# Patient Record
Sex: MALE | Race: BLACK OR AFRICAN AMERICAN | NOT HISPANIC OR LATINO | Employment: UNEMPLOYED | URBAN - METROPOLITAN AREA
[De-identification: names, ages, dates, MRNs, and addresses within clinical notes are randomized per-mention and may not be internally consistent; named-entity substitution may affect disease eponyms.]

---

## 2019-09-20 DIAGNOSIS — M25.512 PAIN IN JOINT OF LEFT SHOULDER: Primary | ICD-10-CM

## 2019-10-16 ENCOUNTER — CLINICAL SUPPORT (OUTPATIENT)
Dept: REHABILITATION | Facility: OTHER | Age: 44
End: 2019-10-16
Payer: MEDICAID

## 2019-10-16 DIAGNOSIS — M25.612 DECREASED RANGE OF MOTION OF LEFT SHOULDER: ICD-10-CM

## 2019-10-16 DIAGNOSIS — M25.512 ACUTE PAIN OF LEFT SHOULDER: ICD-10-CM

## 2019-10-16 DIAGNOSIS — R29.3 POOR POSTURE: ICD-10-CM

## 2019-10-16 PROCEDURE — 97161 PT EVAL LOW COMPLEX 20 MIN: CPT | Mod: PN

## 2019-10-16 NOTE — PATIENT INSTRUCTIONS
FOAM ROLLER PEC MINOR STRETCH        Lie down on a foam roll and hold one of your wrists as it rests on your stomach.     Next, squeeze your shoulder blades together as you lower your shoulders toward the floor as shown.    Hold for a gentle stretch across your chest.    Copyright © 1732-3645 [employer]    SCAPULAR RETRACTIONS          Draw your shoulder blades back and down.    Hold for 5 seconds. Perform 20 reps, 2 times a day.    Copyright © 5524-0647 [employer]
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
## 2019-11-05 ENCOUNTER — CLINICAL SUPPORT (OUTPATIENT)
Dept: REHABILITATION | Facility: OTHER | Age: 44
End: 2019-11-05
Payer: MEDICAID

## 2019-11-05 DIAGNOSIS — M25.512 ACUTE PAIN OF LEFT SHOULDER: ICD-10-CM

## 2019-11-05 DIAGNOSIS — M25.612 DECREASED RANGE OF MOTION OF LEFT SHOULDER: ICD-10-CM

## 2019-11-05 DIAGNOSIS — R29.3 POOR POSTURE: ICD-10-CM

## 2019-11-05 PROCEDURE — 97110 THERAPEUTIC EXERCISES: CPT | Mod: PN

## 2019-11-05 NOTE — PROGRESS NOTES
"  Physical Therapy Daily Treatment Note     Name: Shantanu Payne  Clinic Number: [id number]    Therapy Diagnosis:   Encounter Diagnoses   Name Primary?    Acute pain of left shoulder     Decreased range of motion of left shoulder     Poor posture      Physician: Jonh Siddiqui    Visit Date: 2019    Physician Orders: PT Eval and Treat  Medical Diagnosis: M25.512 (ICD-10-CM) - Pain in joint of left shoulder   Evaluation Date: 10/16/2019  Authorization Period Expiration: 2019  Plan of Care Certification Period: 2019  Visit #/Visits authorized:      Time In: 1:00 PM  Time Out: 2:00 PM  Total Billable Time: 30 minutes    Precautions: Standard     Subjective     Pt reports: recently got an injection the shoulder and is experiencing less tingling. States most of the pain is on the AC joint and posterior shoulder. States he just got a referral for his LB.   He was compliant with home exercise program.  Response to previous treatment: no new changes  Functional change: no new changes    Pain: 7/10  Location: left shoulder      Objective     Shantanu received therapeutic exercises to develop strength, endurance, ROM, flexibility, posture and core stabilization for 30 minutes including:  Scap retractions 20 x 5"  1/2 foam pec stretch x 3 mins  1/2 foam serratus punch 2 x 10 x 3"  Supine no moneys w/ OTB 2 x 10 x 3"  Supine pull aparts w/ OTB 2 x 10 x 3"  Pulleys scaption x 3 mins  Rows w/ OTB 2 x 10  Pulldown w/ OTB 2 x 10    Shantanu received the following manual therapy techniques: Joint mobilizations, Myofacial release and Soft tissue Mobilization were applied to the: L shoulder for 5 minutes, includin min x preparation and application of Kineseotape to L shoulder. Y strip to deltoid, I strip over AC joint for decompression      Home Exercises Provided and Patient Education Provided     Education provided:   - importance of ROM and scapular stabilization for decreased pain, postural awareness, " "proper form and technique, rationale behind each ex     Written Home Exercises Provided: Patient instructed to cont prior HEP.  Exercises were reviewed and Shantanu was able to demonstrate them prior to the end of the session.  Shantanu demonstrated fair  understanding of the education provided.     See EMR under Media for exercises provided prior visit.    Assessment     Fair tolerance to initial treatment session. Session limited today due to patient being on the phone x 15 mins. Pt with mild reports of pain and "popping" at end-range flexion. Pt with reports of decreased pain in scaption plane. Added KT for a trial today to AC joint for decompression and decreased pain.   Shantanu is progressing well towards his goals.   Pt prognosis is Fair.     Pt will continue to benefit from skilled outpatient physical therapy to address the deficits listed in the problem list box on initial evaluation, provide pt/family education and to maximize pt's level of independence in the home and community environment.     Pt's spiritual, cultural and educational needs considered and pt agreeable to plan of care and goals.     Anticipated barriers to physical therapy: none    Goals:   Short Term Goals: 4 weeks   1. Independent with HEP. (progressing, not met)  2. Report decreased L UE pain < or =  5/10 with adls such as reaching forward, reaching behind him, and turning in bed.  (progressing, not met)   3. Increased MMT for B UE by 1 muscle grade to promote proper scapular stability to decrease L UE pain < or =  5/10 with adls such as reaching forward, reaching behind him, and turning in bed.  (progressing, not met)   4. Increased AROM L shoulder flexion to 150 deg, L shoulder abd 150 deg, L shoulder IR 60 deg, L shoulder ER 80 deg. (progressing, not met)          Long Term Goals: 8 weeks   5. Report decreased L UE pain < or =  2/10 with adls such as reaching forward, reaching behind him, and turning in bed.  (progressing, not met)  6. Increased " MMT for B UE to 5/5 muscle grade to promote proper scapular stability to decrease L UE pain < or =  2/10 with adls such as reaching forward, reaching behind him, and turning in bed. (progressing, not met)     7. Increased AROM L shoulder flexion to 170 deg, L shoulder abd 170 deg. (progressing, not met)   8. Patient to achieve CJ (at least 20% < 40% impaired, limited or restricted) level on the FOTO Outcomes Measurement System. (progressing, not met)     Plan     Continue with established PT Plan of Care and focus on ROM, scapular stabilization, and manual therapy.     Edgar Sharma, PTA

## 2019-11-06 DIAGNOSIS — M54.9 DORSALGIA: Primary | ICD-10-CM

## 2019-11-07 ENCOUNTER — CLINICAL SUPPORT (OUTPATIENT)
Dept: REHABILITATION | Facility: OTHER | Age: 44
End: 2019-11-07
Payer: MEDICAID

## 2019-11-07 DIAGNOSIS — M25.612 DECREASED RANGE OF MOTION OF LEFT SHOULDER: ICD-10-CM

## 2019-11-07 DIAGNOSIS — R29.3 POOR POSTURE: ICD-10-CM

## 2019-11-07 DIAGNOSIS — M25.512 ACUTE PAIN OF LEFT SHOULDER: ICD-10-CM

## 2019-11-07 PROCEDURE — 97110 THERAPEUTIC EXERCISES: CPT | Mod: PN

## 2019-11-12 ENCOUNTER — CLINICAL SUPPORT (OUTPATIENT)
Dept: REHABILITATION | Facility: OTHER | Age: 44
End: 2019-11-12
Payer: MEDICAID

## 2019-11-12 DIAGNOSIS — R29.3 POOR POSTURE: ICD-10-CM

## 2019-11-12 DIAGNOSIS — R29.898 WEAKNESS OF BOTH HIPS: ICD-10-CM

## 2019-11-12 DIAGNOSIS — M54.50 ACUTE BILATERAL LOW BACK PAIN WITHOUT SCIATICA: ICD-10-CM

## 2019-11-12 DIAGNOSIS — M25.512 ACUTE PAIN OF LEFT SHOULDER: ICD-10-CM

## 2019-11-12 DIAGNOSIS — M25.612 DECREASED RANGE OF MOTION OF LEFT SHOULDER: ICD-10-CM

## 2019-11-12 PROCEDURE — 97110 THERAPEUTIC EXERCISES: CPT | Mod: PN

## 2019-11-12 NOTE — PATIENT INSTRUCTIONS
Supine Active Hamstring Stretch        Grasp behind knee and keep leg at arms length. Extend leg up until a gentle stretch is found behind your knee.     The opposite knee can be bent or straight at your therapists discretion. Perform 3 reps holding for 30 seconds.     Perform twice a day.    Copyright © 0427-6470 [employer]      Flexors, Standing        Stand, one foot on chair. Use support for balance, if needed. Slowly shift weight toward raised knee until stretch is felt in quadriceps of standing leg and hamstrings of forward leg.     Hold _30__ seconds.  Repeat _3_ times per session. Do _1-2_ sessions per day.    Copyright © I. All rights reserved.

## 2019-11-13 PROBLEM — R29.898 WEAKNESS OF BOTH HIPS: Status: ACTIVE | Noted: 2019-11-13
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
## 2020-01-08 ENCOUNTER — CLINICAL SUPPORT (OUTPATIENT)
Dept: REHABILITATION | Facility: OTHER | Age: 45
End: 2020-01-08
Payer: MEDICAID

## 2020-01-08 DIAGNOSIS — M25.612 DECREASED RANGE OF MOTION OF LEFT SHOULDER: ICD-10-CM

## 2020-01-08 DIAGNOSIS — M54.50 ACUTE BILATERAL LOW BACK PAIN WITHOUT SCIATICA: ICD-10-CM

## 2020-01-08 DIAGNOSIS — R29.898 WEAKNESS OF BOTH HIPS: ICD-10-CM

## 2020-01-08 DIAGNOSIS — R29.3 POOR POSTURE: ICD-10-CM

## 2020-01-08 DIAGNOSIS — M25.512 ACUTE PAIN OF LEFT SHOULDER: ICD-10-CM

## 2020-01-08 PROCEDURE — 97110 THERAPEUTIC EXERCISES: CPT | Mod: PN

## 2020-01-08 NOTE — PLAN OF CARE
Outpatient Therapy Updated Plan of Care     Visit Date: 1/8/2020  Name: Shantanu Payne  Clinic Number: [id number]    Therapy Diagnosis:   Encounter Diagnoses   Name Primary?    Weakness of both hips     Acute bilateral low back pain without sciatica     Acute pain of left shoulder     Decreased range of motion of left shoulder     Poor posture      Physician: Jonh Siddiqui*    Physician Orders: PT Eval and Treat  Medical Diagnosis:   M25.512 (ICD-10-CM) - Pain in joint of left shoulder   M54.9 (ICD-10-CM) - Dorsalgia  M25.519 (ICD-10-CM) - Pain in unspecified shoulde  Evaluation Date: 10/16/2019    Total Visits Received: 5  Cancelled Visits: 4  No Show Visits: 0    Current Certification Period:  10/16/19 to 12/11/19  Precautions:  Standard  Visits from Evaluation Date:  4  Functional Level Prior to Evaluation:  I with amd and ADL, but pain and difficulty with ADL    Time In: 12:00 PM  Time Out: 12:55 PM  Total Billable Time: 55 minutes    Subjective     Update:   Pt states he has been doing yard work, but as he does it he continues to feel increased pain in his L shoulder. States his back has been feeling better  He was compliant with home exercise program.  Response to previous treatment: no adverse effects  Functional change: still having pain with repetitive motions in his L shoulder     Pain: 4/10  Location: left shoulder and low back       Objective     Update:        Cervical Special Tests     Compression negative   Distraction negative   Alar Ligament Stress Test: negative   Sharp-Perry Test negative   Spurling's:    negative      UE MMT R L   C3 Cervical side bend 5/5 5/5   C4 Shoulder Shrug 5/5  5/5   Shoulder flexion 5/5 3-/5   Shoulder abduction 5/5 3-/5   Shoulder IR 5/5 5/5   Shoulder ER 5/5 5/5    C5 Elbow flexion 5/5 5/5   C7 Elbow extension 5/5 5/5   C6 Wrist extension 5/5 5/5   Wrist flexion 5/5 5/5   Scapular protraction 5/5 5/5   Mid Traps 5/5 4+/5   Lower traps 4-/5 3+/5      * -  with pain       Joint Mobility:                   R                     L  Shoulder flex                Full          135 deg/170 deg  Shoulder abd               Full           110 deg/160 deg  Shoulder IR                   Full                50 deg  Shoulder ER               90 deg             70 deg     Flexibility testing:  - hamstrings:         B: tight, R: -45 deg, L: -45 deg       - gastrocnemius:   R: tight, L: WNL. R: 3 deg, L: 10 deg        - piriformis:            R: WNL, L: tight, decreased 25%        - quadriceps:         R: WNL, L: tight, decreased 50%          - hip flexors:          B: tight, decreased 25%  - hip adductors:    B: tight, decreased 25%  - IT Bands:           B: WNL     MMT R L   Hip flexion 5/5 5/5   Hip abduction 5/5 5/5   Hip extension 5/5 5/5   Hip ER 5/5 5/5   Hip IR 5/5 5/5   Knee extension 5/5 5/5   Knee flexion 5/5 5/5   Ankle dorsiflexion 5/5 5/5   Ankle plantar flexion 5/5 5/5   Ankle inversion 5/5 5/5   Ankle eversion 5/5 5/5       CMS Impairment/Limitation/Restriction for FOTO Shoulder Survey    Therapist reviewed FOTO scores for Shantanu Payne on 1/8/2020.   FOTO documents entered into [employer] - see Media section.    Limitation Score: 41%  Category: Carrying    Current : CK = at least 40% but < 60% impaired, limited or restricted  Goal: CJ = at least 20% but < 40% impaired, limited or restricted     Shantanu received therapeutic exercises to develop strength, endurance, ROM, flexibility, posture and core stabilization for 40 minutes including:  Reassessed strength, flexibility, ROM, and G code  UBE 3' forward, 3' reverse    Shantanu received the following manual therapy techniques: dry needling were applied to the: L scapula/upper back for 15 minutes, including:  Application of TDN: Pt educated on benefits and potential side effects of dry needling. Educated pt on benefits, precautions, side effects following TDN. Educated pt to use heat following treatment sessions if pt is  experiencing pain or soreness. Pt verbalized good understanding of education.  Pt signed written consent to dry needling. Pt gave verbal consent for DN    Pt received dry needling to the below listed muscles using 40 mm needles.  L upper trap  L levator scapula muscle  L levator scapula teno-osseous junction    Winding performed every 5 minutes during treatment.      Assessment     Update: Patient has experienced improvement in low back pain, but continues to experienced L shoulder pain. Has increase L shoulder flexion ROM, but still has decreased L shoulder abduction. Initiated dry needling to L shoulder to decrease pain and improve function.    Previous Goals Status:     Short Term Goals: 4 weeks   1. Independent with HEP. (met with current HEP)  2. Report decreased L UE pain < or =  5/10 with adls such as reaching forward, reaching behind him, and turning in bed.  (not met)   3. Increased MMT for B UE by 1 muscle grade to promote proper scapular stability to decrease L UE pain < or =  5/10 with adls such as reaching forward, reaching behind him, and turning in bed.  (not met)   4. Increased AROM L shoulder flexion to 150 deg, L shoulder abd 150 deg, L shoulder IR 60 deg, L shoulder ER 80 deg. (not met)          Long Term Goals: 8 weeks   5. Report decreased L UE pain < or =  2/10 with adls such as reaching forward, reaching behind him, and turning in bed.  (not met)  6. Increased MMT for B UE to 5/5 muscle grade to promote proper scapular stability to decrease L UE pain < or =  2/10 with adls such as reaching forward, reaching behind him, and turning in bed. (not met)     7. Increased AROM L shoulder flexion to 170 deg, L shoulder abd 170 deg. (progressing, not met)   8. Report decreased lumbar pain < or =  3/10 with adls such as prolonged standing, walking, and bending. (not met)  9. Increased MMT for B LE to 5/5 muscle grade to promote proper pelvic stability to decrease lumbar pain < or =  3/10 with adls such  as prolonged standing, walking, and bending. (met)  10. Increased flexibility in B hamstrings to -20 deg with 90/90 test to promote proper pelvic stability to decrease lumbar pain < or =  3/10 with adls such as prolonged standing, walking, and bending. (not met)  11. Increased flexibility in B hip adductors, B piriformis, B hip flexors, and B quads to promote proper pelvic stability to decrease lumbar pain < or =  3/10 with adls such as prolonged standing, walking, and bending. (progressing, not met)  12. Patient to achieve CJ (at least 20% < 40% impaired, limited or restricted) level on the FOTO Outcomes Measurement System. (progressing, not met)        Long Term Goal Status:   continue per initial plan of care.  Reasons for Recertification of Therapy:   Patient canceled last three appointments secondary wanting to follow up with MDs about his shoulder. Will benefit from continued OP PT to increase L shoulder AROM, strength, function, and to decrease L shoulder pain.    Plan     Updated Certification Period: 1/8/2020 to 3/4/2020  Recommended Treatment Plan: 2 times per week for 8 weeks: Manual Therapy, Moist Heat/ Ice, Neuromuscular Re-ed, Patient Education, Therapeutic Activites, Therapeutic Exercise and dry needling      Ariel Baldwin, PT  1/8/2020      I CERTIFY THE NEED FOR THESE SERVICES FURNISHED UNDER THIS PLAN OF TREATMENT AND WHILE UNDER MY CARE    Physician's comments:        Physician's Signature: ___________________________________________________

## 2020-01-21 ENCOUNTER — CLINICAL SUPPORT (OUTPATIENT)
Dept: REHABILITATION | Facility: OTHER | Age: 45
End: 2020-01-21
Payer: MEDICAID

## 2020-01-21 DIAGNOSIS — R29.898 WEAKNESS OF BOTH HIPS: ICD-10-CM

## 2020-01-21 DIAGNOSIS — R29.3 POOR POSTURE: ICD-10-CM

## 2020-01-21 DIAGNOSIS — M25.612 DECREASED RANGE OF MOTION OF LEFT SHOULDER: ICD-10-CM

## 2020-01-21 DIAGNOSIS — M25.512 ACUTE PAIN OF LEFT SHOULDER: ICD-10-CM

## 2020-01-21 DIAGNOSIS — M54.50 ACUTE BILATERAL LOW BACK PAIN WITHOUT SCIATICA: ICD-10-CM

## 2020-01-21 PROCEDURE — 97110 THERAPEUTIC EXERCISES: CPT | Mod: PN,CQ

## 2020-01-21 NOTE — PROGRESS NOTES
"  Physical Therapy Daily Treatment Note     Name: Shantanu Payne  Clinic Number: [id number]    Therapy Diagnosis:   Encounter Diagnoses   Name Primary?    Weakness of both hips     Acute bilateral low back pain without sciatica     Acute pain of left shoulder     Decreased range of motion of left shoulder     Poor posture      Physician: Jonh Siddiqui*    Visit Date: 1/21/2020    Physician Orders: PT Eval and Treat  Medical Diagnosis:   M25.512 (ICD-10-CM) - Pain in joint of left shoulder   M54.9 (ICD-10-CM) - Dorsalgia  M25.519 (ICD-10-CM) - Pain in unspecified shoulde  Evaluation Date: 10/16/2019   Total Visits Received: 6  Cancelled Visits: 4  No Show Visits: 0  Current Certification Period:  10/16/19 to 12/11/19  Precautions:  Standard  Visits from Evaluation Date:  5  Functional Level Prior to Evaluation:  I with amd and ADL, but pain and difficulty with ADL    Time In: 1:00 PM  Time Out: 2:05 PM  Total Billable Time: 65 minutes    Precautions: Standard    Subjective     Pt reports: feeling moderate pain in his L shoulder and low back. States that sitting usually feels better.   He was compliant with home exercise program.  Response to previous treatment: tolerated fair  Functional change:none  Response to previous treatment: no adverse effects  Functional change: still having pain with repetitive motions in his L shoulder     Pain: 7/10 in low back; 4/10 in L shoulder  Location: left shoulder and low back      Objective     Shantanu received therapeutic exercises to develop strength, endurance, ROM, flexibility, posture and core stabilization for 35 minutes including:    UBE 3' forward, 3' reverse  LTR 2x10 3"  Bridging with ball squeeze 20x 5"  Piriformis stretch 3x30" (B)  Supine hip flexor stretch 2x20" (B)  SKTC 3x20"   DKTC with PB 20x 3"  Rows w/OTB 2x10  Shld ext w/OTB 2x10  No monies 2x10 w/OTB  TrA 10x5"    Shantanu received the following manual therapy techniques: dry needling were applied to the: " L scapula/upper back for 00 minutes, including:  Application of TDN: Pt educated on benefits and potential side effects of dry needling. Educated pt on benefits, precautions, side effects following TDN. Educated pt to use heat following treatment sessions if pt is experiencing pain or soreness. Pt verbalized good understanding of education.  Pt signed written consent to dry needling. Pt gave verbal consent for DN NP     Pt received dry needling to the below listed muscles using 40 mm needles.  L upper trap  L levator scapula muscle  L levator scapula teno-osseous junction     Winding performed every 5 minutes during treatment. NP      Shantanu received hot pack for 00 minutes to NP.    Shantanu received cold pack for 00 minutes to NP.    **Taken at 1/8/2020**    UE MMT R L   C3 Cervical side bend 5/5 5/5   C4 Shoulder Shrug 5/5  5/5   Shoulder flexion 5/5 3-/5   Shoulder abduction 5/5 3-/5   Shoulder IR 5/5 5/5   Shoulder ER 5/5 5/5    C5 Elbow flexion 5/5 5/5   C7 Elbow extension 5/5 5/5   C6 Wrist extension 5/5 5/5   Wrist flexion 5/5 5/5   Scapular protraction 5/5 5/5   Mid Traps 5/5 4+/5   Lower traps 4-/5 3+/5      * - with pain        Joint Mobility:                   R                     L  Shoulder flex                Full          135 deg/170 deg  Shoulder abd               Full           110 deg/160 deg  Shoulder IR                   Full                50 deg  Shoulder ER               90 deg             70 deg     Flexibility testing:  - hamstrings:         B: tight, R: -45 deg, L: -45 deg       - gastrocnemius:   R: tight, L: WNL. R: 3 deg, L: 10 deg        - piriformis:            R: WNL, L: tight, decreased 25%        - quadriceps:         R: WNL, L: tight, decreased 50%          - hip flexors:          B: tight, decreased 25%  - hip adductors:    B: tight, decreased 25%  - IT Bands:           B: WNL       Home Exercises Provided and Patient Education Provided     Education provided:   Rational for there-ex,  POC and HEP    Written Home Exercises Provided: yes.  Exercises were reviewed and Shantanu was able to demonstrate them prior to the end of the session.  Shantanu demonstrated good  understanding of the education provided.     See EMR under Patient Instructions for exercises provided 1/21/2020.    Assessment     Pt tolerated exercise fair with mod complaints of increased pain. Pt was demonstrated moderate muscle guarding during LTR in supine. Muscle weakness in paraspinals/core musculature were notable during exercises. Pt was able to demonstrate improved core/transverse abdominis activation after cuing.  Pt requires verbal/tactile cues to initiate exercise and to correct form. PT/PTA face to face conference concerning pt status/TX.     Shantanu is progressing well towards his goals.   Pt prognosis is Fair.       Pt will continue to benefit from skilled outpatient physical therapy to address the deficits listed in the problem list box on initial evaluation, provide pt/family education and to maximize pt's level of independence in the home and community environment.     Pt's spiritual, cultural and educational needs considered and pt agreeable to plan of care and goals.     Anticipated barriers to physical therapy: none     Update: Patient has experienced improvement in low back pain, but continues to experienced L shoulder pain. Has increase L shoulder flexion ROM, but still has decreased L shoulder abduction. Initiated dry needling to L shoulder to decrease pain and improve function.     Previous Goals Status:     Short Term Goals: 4 weeks   1. Independent with HEP. (met with current HEP)  2. Report decreased L UE pain < or =  5/10 with adls such as reaching forward, reaching behind him, and turning in bed.  (not met)   3. Increased MMT for B UE by 1 muscle grade to promote proper scapular stability to decrease L UE pain < or =  5/10 with adls such as reaching forward, reaching behind him, and turning in bed.  (not met)    4. Increased AROM L shoulder flexion to 150 deg, L shoulder abd 150 deg, L shoulder IR 60 deg, L shoulder ER 80 deg. (not met)          Long Term Goals: 8 weeks   5. Report decreased L UE pain < or =  2/10 with adls such as reaching forward, reaching behind him, and turning in bed.  (not met)  6. Increased MMT for B UE to 5/5 muscle grade to promote proper scapular stability to decrease L UE pain < or =  2/10 with adls such as reaching forward, reaching behind him, and turning in bed. (not met)     7. Increased AROM L shoulder flexion to 170 deg, L shoulder abd 170 deg. (progressing, not met)   8. Report decreased lumbar pain < or =  3/10 with adls such as prolonged standing, walking, and bending. (not met)  9. Increased MMT for B LE to 5/5 muscle grade to promote proper pelvic stability to decrease lumbar pain < or =  3/10 with adls such as prolonged standing, walking, and bending. (met)  10. Increased flexibility in B hamstrings to -20 deg with 90/90 test to promote proper pelvic stability to decrease lumbar pain < or =  3/10 with adls such as prolonged standing, walking, and bending. (not met)  11. Increased flexibility in B hip adductors, B piriformis, B hip flexors, and B quads to promote proper pelvic stability to decrease lumbar pain < or =  3/10 with adls such as prolonged standing, walking, and bending. (progressing, not met)  12. Patient to achieve CJ (at least 20% < 40% impaired, limited or restricted) level on the FOTO Outcomes Measurement System. (progressing, not met)         Long Term Goal Status:   continue per initial plan of care.  Reasons for Recertification of Therapy:   Patient canceled last three appointments secondary wanting to follow up with MDs about his shoulder. Will benefit from continued OP PT to increase L shoulder AROM, strength, function, and to decrease L shoulder pain.      Plan   Updated Certification Period: 1/8/2020 to 3/4/2020  Recommended Treatment Plan: 2 times per week for  8 weeks: Manual Therapy, Moist Heat/ Ice, Neuromuscular Re-ed, Patient Education, Therapeutic Activites, Therapeutic Exercise and dry needling    Continue with current POC for further strengthening, flexibility, improve ROM/posture and ADL performance. Will progress there-ex as tolerated.       Fidencio Liu, PTA

## 2020-01-23 ENCOUNTER — CLINICAL SUPPORT (OUTPATIENT)
Dept: REHABILITATION | Facility: OTHER | Age: 45
End: 2020-01-23
Payer: MEDICAID

## 2020-01-23 DIAGNOSIS — M25.512 ACUTE PAIN OF LEFT SHOULDER: ICD-10-CM

## 2020-01-23 DIAGNOSIS — R29.3 POOR POSTURE: ICD-10-CM

## 2020-01-23 DIAGNOSIS — M54.50 ACUTE BILATERAL LOW BACK PAIN WITHOUT SCIATICA: ICD-10-CM

## 2020-01-23 DIAGNOSIS — M25.612 DECREASED RANGE OF MOTION OF LEFT SHOULDER: ICD-10-CM

## 2020-01-23 DIAGNOSIS — R29.898 WEAKNESS OF BOTH HIPS: ICD-10-CM

## 2020-01-23 PROCEDURE — 97110 THERAPEUTIC EXERCISES: CPT | Mod: PN,CQ

## 2020-01-23 NOTE — PROGRESS NOTES
"  Physical Therapy Daily Treatment Note     Name: Shantanu Payne  Clinic Number: [id number]    Therapy Diagnosis:   Encounter Diagnoses   Name Primary?    Weakness of both hips     Acute bilateral low back pain without sciatica     Acute pain of left shoulder     Decreased range of motion of left shoulder     Poor posture      Physician: Jonh Siddiqui*    Visit Date: 1/23/2020    Physician Orders: PT Eval and Treat  Medical Diagnosis:   M25.512 (ICD-10-CM) - Pain in joint of left shoulder   M54.9 (ICD-10-CM) - Dorsalgia  M25.519 (ICD-10-CM) - Pain in unspecified shoulder  Evaluation Date: 10/16/2019   Total Visits Received: 7  Cancelled Visits: 4  No Show Visits: 0  Current Certification Period:  10/16/19 to 12/11/19  Precautions:  Standard  Visits from Evaluation Date:  6  Functional Level Prior to Evaluation:  I with amd and ADL, but pain and difficulty with ADL    Time In: 12:00 PM  Time Out: 1:00 PM  Total Billable Time: 60 minutes    Precautions: Standard    Subjective     Pt reports: feeling much better in his Low back. States he is not getting any pain today. Reports that his shoulder is about a 3/10  He was compliant with home exercise program.  Response to previous treatment: tolerated well  Functional change: Decreased pain reported in the Lumbar region  Response to previous treatment: no adverse effects  Functional change: still having pain with repetitive motions in his L shoulder     Pain: 0/10 in low back; 3/10 in L shoulder  Location: left shoulder and low back      Objective     Shantanu received therapeutic exercises to develop strength, endurance, ROM, flexibility, posture and core stabilization for 38 minutes including:    UBE 3' forward, 3' reverse  LTR 2x10 3"  Bridging with ball squeeze 20x 5"  Piriformis stretch 3x30" (B)  Supine hip flexor stretch 2x20" (B)  SKTC 3x20"   DKTC with PB 20x 3"  Rows w/OTB 2x10  Shld ext w/OTB 2x10  No monies 2x10 w/OTB  TrA 10x5"    Shantanu received the " following manual therapy techniques: dry needling were applied to the: L scapula/upper back for 00 minutes, including:  Application of TDN: Pt educated on benefits and potential side effects of dry needling. Educated pt on benefits, precautions, side effects following TDN. Educated pt to use heat following treatment sessions if pt is experiencing pain or soreness. Pt verbalized good understanding of education.  Pt signed written consent to dry needling. Pt gave verbal consent for DN NP     Pt received dry needling to the below listed muscles using 40 mm needles.  L upper trap  L levator scapula muscle  L levator scapula teno-osseous junction     Winding performed every 5 minutes during treatment. NP      Shantanu received hot pack for 00 minutes to NP.    Shantanu received cold pack for 00 minutes to NP.    **Taken at 1/8/2020**    UE MMT R L   C3 Cervical side bend 5/5 5/5   C4 Shoulder Shrug 5/5  5/5   Shoulder flexion 5/5 3-/5   Shoulder abduction 5/5 3-/5   Shoulder IR 5/5 5/5   Shoulder ER 5/5 5/5    C5 Elbow flexion 5/5 5/5   C7 Elbow extension 5/5 5/5   C6 Wrist extension 5/5 5/5   Wrist flexion 5/5 5/5   Scapular protraction 5/5 5/5   Mid Traps 5/5 4+/5   Lower traps 4-/5 3+/5      * - with pain        Joint Mobility:                   R                     L  Shoulder flex                Full          135 deg/170 deg  Shoulder abd               Full           110 deg/160 deg  Shoulder IR                   Full                50 deg  Shoulder ER               90 deg             70 deg     Flexibility testing:  - hamstrings:         B: tight, R: -45 deg, L: -45 deg       - gastrocnemius:   R: tight, L: WNL. R: 3 deg, L: 10 deg        - piriformis:            R: WNL, L: tight, decreased 25%        - quadriceps:         R: WNL, L: tight, decreased 50%          - hip flexors:          B: tight, decreased 25%  - hip adductors:    B: tight, decreased 25%  - IT Bands:           B: WNL       Home Exercises Provided and  Patient Education Provided     Education provided:   Rational for there-ex, POC and HEP    Written Home Exercises Provided: yes.  Exercises were reviewed and Shantanu was able to demonstrate them prior to the end of the session.  Shantanu demonstrated good  understanding of the education provided.     See EMR under Patient Instructions for exercises provided 1/21/2020.    Assessment     Pt tolerated exercise with improved performance. Little to no much guarding during Lumbar exercise was noted today. Pt reported slightly increased soreness with scapular/shoulder retraction during rows today. Pt reported pain in the L shoulder was localized to the supraspinatus muscle belly.     Shantanu is progressing well towards his goals.   Pt prognosis is Fair.       Pt will continue to benefit from skilled outpatient physical therapy to address the deficits listed in the problem list box on initial evaluation, provide pt/family education and to maximize pt's level of independence in the home and community environment.     Pt's spiritual, cultural and educational needs considered and pt agreeable to plan of care and goals.     Anticipated barriers to physical therapy: none     Update: Patient has experienced improvement in low back pain, but continues to experienced L shoulder pain. Has increase L shoulder flexion ROM, but still has decreased L shoulder abduction. Initiated dry needling to L shoulder to decrease pain and improve function.     Previous Goals Status:     Short Term Goals: 4 weeks   1. Independent with HEP. (met with current HEP)  2. Report decreased L UE pain < or =  5/10 with adls such as reaching forward, reaching behind him, and turning in bed.  (not met)   3. Increased MMT for B UE by 1 muscle grade to promote proper scapular stability to decrease L UE pain < or =  5/10 with adls such as reaching forward, reaching behind him, and turning in bed.  (not met)   4. Increased AROM L shoulder flexion to 150 deg, L shoulder abd  150 deg, L shoulder IR 60 deg, L shoulder ER 80 deg. (not met)          Long Term Goals: 8 weeks   5. Report decreased L UE pain < or =  2/10 with adls such as reaching forward, reaching behind him, and turning in bed.  (not met)  6. Increased MMT for B UE to 5/5 muscle grade to promote proper scapular stability to decrease L UE pain < or =  2/10 with adls such as reaching forward, reaching behind him, and turning in bed. (not met)     7. Increased AROM L shoulder flexion to 170 deg, L shoulder abd 170 deg. (progressing, not met)   8. Report decreased lumbar pain < or =  3/10 with adls such as prolonged standing, walking, and bending. (not met)  9. Increased MMT for B LE to 5/5 muscle grade to promote proper pelvic stability to decrease lumbar pain < or =  3/10 with adls such as prolonged standing, walking, and bending. (met)  10. Increased flexibility in B hamstrings to -20 deg with 90/90 test to promote proper pelvic stability to decrease lumbar pain < or =  3/10 with adls such as prolonged standing, walking, and bending. (not met)  11. Increased flexibility in B hip adductors, B piriformis, B hip flexors, and B quads to promote proper pelvic stability to decrease lumbar pain < or =  3/10 with adls such as prolonged standing, walking, and bending. (progressing, not met)  12. Patient to achieve CJ (at least 20% < 40% impaired, limited or restricted) level on the FOTO Outcomes Measurement System. (progressing, not met)         Long Term Goal Status:   continue per initial plan of care.  Reasons for Recertification of Therapy:   Patient canceled last three appointments secondary wanting to follow up with MDs about his shoulder. Will benefit from continued OP PT to increase L shoulder AROM, strength, function, and to decrease L shoulder pain.      Plan   Updated Certification Period: 1/8/2020 to 3/4/2020  Recommended Treatment Plan: 2 times per week for 8 weeks: Manual Therapy, Moist Heat/ Ice, Neuromuscular Re-ed,  Patient Education, Therapeutic Activites, Therapeutic Exercise and dry needling    Continue with current POC for further strengthening, flexibility, improve ROM/posture and ADL performance. Will progress there-ex as tolerated.       Fidencio Liu, PTA

## 2020-01-27 ENCOUNTER — CLINICAL SUPPORT (OUTPATIENT)
Dept: REHABILITATION | Facility: OTHER | Age: 45
End: 2020-01-27
Payer: MEDICAID

## 2020-01-27 DIAGNOSIS — R29.3 POOR POSTURE: ICD-10-CM

## 2020-01-27 DIAGNOSIS — R29.898 WEAKNESS OF BOTH HIPS: ICD-10-CM

## 2020-01-27 DIAGNOSIS — M54.50 ACUTE BILATERAL LOW BACK PAIN WITHOUT SCIATICA: ICD-10-CM

## 2020-01-27 DIAGNOSIS — M25.512 ACUTE PAIN OF LEFT SHOULDER: ICD-10-CM

## 2020-01-27 DIAGNOSIS — M25.612 DECREASED RANGE OF MOTION OF LEFT SHOULDER: ICD-10-CM

## 2020-01-27 PROCEDURE — 97110 THERAPEUTIC EXERCISES: CPT | Mod: PN,CQ

## 2020-01-27 NOTE — PROGRESS NOTES
"  Physical Therapy Daily Treatment Note     Name: Shantanu Payne  Clinic Number: [id number]    Therapy Diagnosis:   Encounter Diagnoses   Name Primary?    Weakness of both hips     Acute bilateral low back pain without sciatica     Acute pain of left shoulder     Decreased range of motion of left shoulder     Poor posture      Physician: Jonh Siddiqui*    Visit Date: 1/27/2020    Physician Orders: PT Eval and Treat  Medical Diagnosis:   M25.512 (ICD-10-CM) - Pain in joint of left shoulder   M54.9 (ICD-10-CM) - Dorsalgia  M25.519 (ICD-10-CM) - Pain in unspecified shoulder  Evaluation Date: 10/16/2019   Total Visits Received: 8  Cancelled Visits: 4  No Show Visits: 0  Current Certification Period:  10/16/19 to 12/11/19  Precautions:  Standard  Visits from Evaluation Date:  7  Functional Level Prior to Evaluation:  I with amd and ADL, but pain and difficulty with ADL    Time In: 12:15 PM (pt arrived late)  Time Out: 1:00 PM  Total Billable Time:45 minutes    Precautions: Standard    Subjective     Pt reports: feeling much better in his Low back. States he is not getting any pain today. Reports that his shoulder is about a 3/10  He was compliant with home exercise program.  Response to previous treatment: tolerated well  Functional change: Decreased pain reported in the Lumbar region  Response to previous treatment: no adverse effects  Functional change: still having pain with repetitive motions in his L shoulder     Pain: 0/10 in low back; 3/10 in L shoulder  Location: left shoulder and low back      Objective     Shantanu received therapeutic exercises to develop strength, endurance, ROM, flexibility, posture and core stabilization for 45  minutes including:    UBE 3' forward, 3' reverse  LTR 2x10 3"  Bridging with ball squeeze 20x 5"  Piriformis stretch 3x30" (B)  Supine hip flexor stretch 2x20" (B)  SKTC 3x20"   DKTC with PB 20x 3"  Rows w/OTB 2x10  Shld ext w/OTB 2x10  IR/ER w/OTB 20x ea  No monies 2x10 " "w/OTB  TrA 10x5"    Shantanu received the following manual therapy techniques: dry needling were applied to the: L scapula/upper back for 00 minutes, including:  Application of TDN: Pt educated on benefits and potential side effects of dry needling. Educated pt on benefits, precautions, side effects following TDN. Educated pt to use heat following treatment sessions if pt is experiencing pain or soreness. Pt verbalized good understanding of education.  Pt signed written consent to dry needling. Pt gave verbal consent for DN NP     Pt received dry needling to the below listed muscles using 40 mm needles.  L upper trap  L levator scapula muscle  L levator scapula teno-osseous junction     Winding performed every 5 minutes during treatment. NP      Shantanu received hot pack for 00 minutes to NP.    Shantanu received cold pack for 00 minutes to NP.    **Taken at 1/8/2020**    UE MMT R L   C3 Cervical side bend 5/5 5/5   C4 Shoulder Shrug 5/5  5/5   Shoulder flexion 5/5 3-/5   Shoulder abduction 5/5 3-/5   Shoulder IR 5/5 5/5   Shoulder ER 5/5 5/5    C5 Elbow flexion 5/5 5/5   C7 Elbow extension 5/5 5/5   C6 Wrist extension 5/5 5/5   Wrist flexion 5/5 5/5   Scapular protraction 5/5 5/5   Mid Traps 5/5 4+/5   Lower traps 4-/5 3+/5      * - with pain        Joint Mobility:                   R                     L  Shoulder flex                Full          135 deg/170 deg  Shoulder abd               Full           110 deg/160 deg  Shoulder IR                   Full                50 deg  Shoulder ER               90 deg             70 deg     Flexibility testing:  - hamstrings:         B: tight, R: -45 deg, L: -45 deg       - gastrocnemius:   R: tight, L: WNL. R: 3 deg, L: 10 deg        - piriformis:            R: WNL, L: tight, decreased 25%        - quadriceps:         R: WNL, L: tight, decreased 50%          - hip flexors:          B: tight, decreased 25%  - hip adductors:    B: tight, decreased 25%  - IT Bands:           B: " WNL       Home Exercises Provided and Patient Education Provided     Education provided:   Rational for there-ex, POC and HEP    Written Home Exercises Provided: yes.  Exercises were reviewed and Shantanu was able to demonstrate them prior to the end of the session.  Shantanu demonstrated good  understanding of the education provided.     See EMR under Patient Instructions for exercises provided 1/21/2020.    Assessment     Pt tolerated exercise well. Muscle weakness was notable with shoulder external rotation exercises. Decreased muscle guarding was noted during trunk rotation this visit. Pt reported increased ADL performance during subjective/verbal.    Shantanu is progressing well towards his goals.   Pt prognosis is Fair.       Pt will continue to benefit from skilled outpatient physical therapy to address the deficits listed in the problem list box on initial evaluation, provide pt/family education and to maximize pt's level of independence in the home and community environment.     Pt's spiritual, cultural and educational needs considered and pt agreeable to plan of care and goals.     Anticipated barriers to physical therapy: none     Update: Patient has experienced improvement in low back pain, but continues to experienced L shoulder pain. Has increase L shoulder flexion ROM, but still has decreased L shoulder abduction. Initiated dry needling to L shoulder to decrease pain and improve function.     Previous Goals Status:     Short Term Goals: 4 weeks   1. Independent with HEP. (met with current HEP)  2. Report decreased L UE pain < or =  5/10 with adls such as reaching forward, reaching behind him, and turning in bed.  (not met)   3. Increased MMT for B UE by 1 muscle grade to promote proper scapular stability to decrease L UE pain < or =  5/10 with adls such as reaching forward, reaching behind him, and turning in bed.  (not met)   4. Increased AROM L shoulder flexion to 150 deg, L shoulder abd 150 deg, L shoulder IR  60 deg, L shoulder ER 80 deg. (not met)          Long Term Goals: 8 weeks   5. Report decreased L UE pain < or =  2/10 with adls such as reaching forward, reaching behind him, and turning in bed.  (not met)  6. Increased MMT for B UE to 5/5 muscle grade to promote proper scapular stability to decrease L UE pain < or =  2/10 with adls such as reaching forward, reaching behind him, and turning in bed. (not met)     7. Increased AROM L shoulder flexion to 170 deg, L shoulder abd 170 deg. (progressing, not met)   8. Report decreased lumbar pain < or =  3/10 with adls such as prolonged standing, walking, and bending. (not met)  9. Increased MMT for B LE to 5/5 muscle grade to promote proper pelvic stability to decrease lumbar pain < or =  3/10 with adls such as prolonged standing, walking, and bending. (met)  10. Increased flexibility in B hamstrings to -20 deg with 90/90 test to promote proper pelvic stability to decrease lumbar pain < or =  3/10 with adls such as prolonged standing, walking, and bending. (not met)  11. Increased flexibility in B hip adductors, B piriformis, B hip flexors, and B quads to promote proper pelvic stability to decrease lumbar pain < or =  3/10 with adls such as prolonged standing, walking, and bending. (progressing, not met)  12. Patient to achieve CJ (at least 20% < 40% impaired, limited or restricted) level on the FOTO Outcomes Measurement System. (progressing, not met)         Long Term Goal Status:   continue per initial plan of care.  Reasons for Recertification of Therapy:   Patient canceled last three appointments secondary wanting to follow up with MDs about his shoulder. Will benefit from continued OP PT to increase L shoulder AROM, strength, function, and to decrease L shoulder pain.      Plan   Updated Certification Period: 1/8/2020 to 3/4/2020  Recommended Treatment Plan: 2 times per week for 8 weeks: Manual Therapy, Moist Heat/ Ice, Neuromuscular Re-ed, Patient Education,  Therapeutic Activites, Therapeutic Exercise and dry needling    Continue with current POC for further strengthening, flexibility, improve ROM/posture and ADL performance. Will progress there-ex as tolerated.       Fidencio Liu, PTA

## 2020-02-05 ENCOUNTER — CLINICAL SUPPORT (OUTPATIENT)
Dept: REHABILITATION | Facility: OTHER | Age: 45
End: 2020-02-05
Payer: MEDICAID

## 2020-02-05 DIAGNOSIS — M25.512 ACUTE PAIN OF LEFT SHOULDER: ICD-10-CM

## 2020-02-05 DIAGNOSIS — M25.612 DECREASED RANGE OF MOTION OF LEFT SHOULDER: ICD-10-CM

## 2020-02-05 DIAGNOSIS — R29.898 WEAKNESS OF BOTH HIPS: ICD-10-CM

## 2020-02-05 DIAGNOSIS — R29.3 POOR POSTURE: ICD-10-CM

## 2020-02-05 DIAGNOSIS — M54.50 ACUTE BILATERAL LOW BACK PAIN WITHOUT SCIATICA: ICD-10-CM

## 2020-02-05 PROCEDURE — 97110 THERAPEUTIC EXERCISES: CPT | Mod: PN

## 2020-02-05 NOTE — PROGRESS NOTES
"                            Physical Therapy Daily Treatment Note     Name: Shantanu Payne  Clinic Number: [id number]    Therapy Diagnosis:   Encounter Diagnoses   Name Primary?    Weakness of both hips     Acute bilateral low back pain without sciatica     Acute pain of left shoulder     Decreased range of motion of left shoulder     Poor posture      Physician: Jonh Siddiqui*    Visit Date: 2/5/2020  Physician Orders: PT Eval and Treat  Medical Diagnosis:   M25.512 (ICD-10-CM) - Pain in joint of left shoulder   M54.9 (ICD-10-CM) - Dorsalgia  M25.519 (ICD-10-CM) - Pain in unspecified shoulder  Evaluation Date: 10/16/2019  Authorization Period Expiration: 4/8/2020  Plan of Care Certification Period: 3/4/2020  Visit #/Visits authorized: 9 (5/20)    Time In: 12:00 PM  Time Out: 12:55 PM  Total Billable Time: 30 minutes    Precautions: Standard    Subjective   Pt reports: his back is feeling fine. States the left shoulder is better, but a day or two ago had some increased pain.  He was compliant with home exercise program.  Response to previous treatment: decreased pain  Functional change: no new changes reported    Pain: 3/10  Location: shoulder  left    Objective     Shantanu received therapeutic exercises to develop strength, ROM, flexibility, posture and core stabilization for 30 minutes including:  UBE 3' forward, 3' reverse  Rows w/GTB 20 x 3"  Shld ext w/GTB 20 x 3"  IR/ER w/GTB 20x ea  +prone Ws 20 x 3"  +Prone Ts 20 x 3"  +Prone Ys 20 x 3"  +Prone Is 20 x 3"        LTR 2x10 3"  Bridging with ball squeeze 20x 5"  Piriformis stretch 3x30" (B)  Supine hip flexor stretch 2x20" (B)  SKTC 3x20"   DKTC with PB 20x 3"    No monies 2x10 w/GTB  TrA 10x5"      Shantanu received the following manual therapy techniques: dry needling were applied to the: L scapula/upper back for 20 minutes, including:  Application of TDN: Pt educated on benefits and potential side effects of dry needling. Educated pt on benefits, " precautions, side effects following TDN. Educated pt to use heat following treatment sessions if pt is experiencing pain or soreness. Pt verbalized good understanding of education.  Pt signed written consent to dry needling. Pt gave verbal consent for DN NP     Pt received dry needling to the below listed muscles using 40 mm needles.  L upper trap  L levator scapula muscle  L levator scapula teno-osseous junction  L rhomboids  L infraspinatus  L supraspinatus     Winding performed every 5 minutes during treatment. NP    Shantanu received hot pack for 10 minutes to L shoulder.    Home Exercises Provided and Patient Education Provided     Education provided:   prone Ws   Prone Ts   Prone Ys   Prone Is     Written Home Exercises Provided: yes.  Exercises were reviewed and Shantanu was able to demonstrate them prior to the end of the session.  Shantanu demonstrated good  understanding of the education provided.     See EMR under Patient Instructions for exercises provided 2/3/2020.    Assessment     Increased resistance with bands today. Dry needles to Infraspinatus, Supraspinatus, and Rhomboids today. Good soft tissue response to dry needling evident by increased grasp with unilateral winding at all insertion points. Winding performed every 5 minutes during treatment. No adverse effects following treatment.    Shantanu is progressing well towards his goals.   Pt prognosis is Good.     Pt will continue to benefit from skilled outpatient physical therapy to address the deficits listed in the problem list box on initial evaluation, provide pt/family education and to maximize pt's level of independence in the home and community environment.     Pt's spiritual, cultural and educational needs considered and pt agreeable to plan of care and goals.     Anticipated barriers to physical therapy: none     Goals:  Short Term Goals: 4 weeks   1. Independent with HEP. (met with current HEP)  2. Report decreased L UE pain < or =  5/10 with adls such  as reaching forward, reaching behind him, and turning in bed.  (not met)   3. Increased MMT for B UE by 1 muscle grade to promote proper scapular stability to decrease L UE pain < or =  5/10 with adls such as reaching forward, reaching behind him, and turning in bed.  (not met)   4. Increased AROM L shoulder flexion to 150 deg, L shoulder abd 150 deg, L shoulder IR 60 deg, L shoulder ER 80 deg. (not met)          Long Term Goals: 8 weeks   5. Report decreased L UE pain < or =  2/10 with adls such as reaching forward, reaching behind him, and turning in bed.  (not met)  6. Increased MMT for B UE to 5/5 muscle grade to promote proper scapular stability to decrease L UE pain < or =  2/10 with adls such as reaching forward, reaching behind him, and turning in bed. (not met)     7. Increased AROM L shoulder flexion to 170 deg, L shoulder abd 170 deg. (progressing, not met)   8. Report decreased lumbar pain < or =  3/10 with adls such as prolonged standing, walking, and bending. (not met)  9. Increased MMT for B LE to 5/5 muscle grade to promote proper pelvic stability to decrease lumbar pain < or =  3/10 with adls such as prolonged standing, walking, and bending. (met)  10. Increased flexibility in B hamstrings to -20 deg with 90/90 test to promote proper pelvic stability to decrease lumbar pain < or =  3/10 with adls such as prolonged standing, walking, and bending. (not met)  11. Increased flexibility in B hip adductors, B piriformis, B hip flexors, and B quads to promote proper pelvic stability to decrease lumbar pain < or =  3/10 with adls such as prolonged standing, walking, and bending. (progressing, not met)  12. Patient to achieve CJ (at least 20% < 40% impaired, limited or restricted) level on the FOTO Outcomes Measurement System. (progressing, not met)            Plan     Continue with scapular stabilization and dry needling.    Ariel Baldwin, PT

## 2020-02-05 NOTE — PATIENT INSTRUCTIONS
PRONE W        Lying face down with your elbows bent and palms facing downward, slowly raise your arms up towards the ceiling as you squeeze your shoulder blades downward and towards your spine.     Hold for  3  Seconds. Perform 1 sets of  20  Reps.    Copyright © 5579-1484 [employer]      PRONE Y        Lying face down with your arms stretched out upwards as shown, slowly move your arms upward towards the ceiling as you squeeze your shoulder blades downward and towards your spine.     Hold for  3  Seconds. Perform 1 sets of  20  Reps.    Copyright © 5377-1153 [employer]    PRONE RETRACTION EXTENSION - PRONE I          Lying face down with your arms by your side, slowly move your arms upward towards the ceiling as you squeeze your shoulder blades downwards and towards your spine.     Hold for  3  Seconds. Perform 1 sets of  20  Reps.    Copyright © 0567-2939 [employer]    Prone Ts        Start Position: arms out to your sides (like an airplane wing) Palms facing down.    End position: squeeze your shoulder blades together and raise your arms up     Hold for  3  Seconds. Perform 1 sets of  20  Reps.    Copyright © 1590-9987 [employer]

## 2021-01-25 ENCOUNTER — TELEPHONE (OUTPATIENT)
Dept: HEMATOLOGY/ONCOLOGY | Facility: CLINIC | Age: 46
End: 2021-01-25

## 2021-02-10 ENCOUNTER — TELEPHONE (OUTPATIENT)
Dept: HEMATOLOGY/ONCOLOGY | Facility: CLINIC | Age: 46
End: 2021-02-10

## 2021-03-19 ENCOUNTER — TELEPHONE (OUTPATIENT)
Dept: HEMATOLOGY/ONCOLOGY | Facility: CLINIC | Age: 46
End: 2021-03-19

## 2021-03-22 ENCOUNTER — TELEPHONE (OUTPATIENT)
Dept: HEMATOLOGY/ONCOLOGY | Facility: CLINIC | Age: 46
End: 2021-03-22

## 2021-04-14 DIAGNOSIS — R91.1 SOLITARY LUNG NODULE: Primary | ICD-10-CM

## 2021-04-16 ENCOUNTER — LAB VISIT (OUTPATIENT)
Dept: INTERNAL MEDICINE | Facility: CLINIC | Age: 46
End: 2021-04-16
Payer: MEDICAID

## 2021-04-16 ENCOUNTER — LAB VISIT (OUTPATIENT)
Dept: LAB | Facility: HOSPITAL | Age: 46
End: 2021-04-16
Attending: INTERNAL MEDICINE
Payer: MEDICAID

## 2021-04-16 ENCOUNTER — OFFICE VISIT (OUTPATIENT)
Dept: HEMATOLOGY/ONCOLOGY | Facility: CLINIC | Age: 46
End: 2021-04-16
Payer: MEDICAID

## 2021-04-16 VITALS
HEIGHT: 73 IN | SYSTOLIC BLOOD PRESSURE: 121 MMHG | DIASTOLIC BLOOD PRESSURE: 77 MMHG | OXYGEN SATURATION: 100 % | TEMPERATURE: 98 F | RESPIRATION RATE: 16 BRPM | HEART RATE: 77 BPM | WEIGHT: 161.25 LBS | BODY MASS INDEX: 21.37 KG/M2

## 2021-04-16 DIAGNOSIS — D72.819 LEUKOPENIA, UNSPECIFIED TYPE: Primary | ICD-10-CM

## 2021-04-16 DIAGNOSIS — Z13.9 SCREENING PROCEDURE: ICD-10-CM

## 2021-04-16 DIAGNOSIS — D72.819 LEUKOPENIA, UNSPECIFIED TYPE: ICD-10-CM

## 2021-04-16 PROBLEM — R29.3 POOR POSTURE: Status: RESOLVED | Noted: 2019-10-16 | Resolved: 2021-04-16

## 2021-04-16 LAB
ALBUMIN SERPL BCP-MCNC: 4.2 G/DL (ref 3.5–5.2)
ALP SERPL-CCNC: 74 U/L (ref 55–135)
ALT SERPL W/O P-5'-P-CCNC: 11 U/L (ref 10–44)
ANION GAP SERPL CALC-SCNC: 11 MMOL/L (ref 8–16)
AST SERPL-CCNC: 19 U/L (ref 10–40)
BASOPHILS # BLD AUTO: 0.04 K/UL (ref 0–0.2)
BASOPHILS NFR BLD: 0.9 % (ref 0–1.9)
BILIRUB SERPL-MCNC: 0.9 MG/DL (ref 0.1–1)
BUN SERPL-MCNC: 11 MG/DL (ref 6–20)
CALCIUM SERPL-MCNC: 9.4 MG/DL (ref 8.7–10.5)
CHLORIDE SERPL-SCNC: 102 MMOL/L (ref 95–110)
CO2 SERPL-SCNC: 25 MMOL/L (ref 23–29)
CREAT SERPL-MCNC: 1.1 MG/DL (ref 0.5–1.4)
DIFFERENTIAL METHOD: ABNORMAL
EOSINOPHIL # BLD AUTO: 0.1 K/UL (ref 0–0.5)
EOSINOPHIL NFR BLD: 2.6 % (ref 0–8)
ERYTHROCYTE [DISTWIDTH] IN BLOOD BY AUTOMATED COUNT: 13 % (ref 11.5–14.5)
EST. GFR  (AFRICAN AMERICAN): >60 ML/MIN/1.73 M^2
EST. GFR  (NON AFRICAN AMERICAN): >60 ML/MIN/1.73 M^2
FOLATE SERPL-MCNC: 14.7 NG/ML (ref 4–24)
GLUCOSE SERPL-MCNC: 80 MG/DL (ref 70–110)
HBV CORE AB SERPL QL IA: NEGATIVE
HBV SURFACE AG SERPL QL IA: NEGATIVE
HCT VFR BLD AUTO: 47.8 % (ref 40–54)
HCV AB SERPL QL IA: NEGATIVE
HGB BLD-MCNC: 15.3 G/DL (ref 14–18)
HIV 1+2 AB+HIV1 P24 AG SERPL QL IA: NEGATIVE
IMM GRANULOCYTES # BLD AUTO: 0.01 K/UL (ref 0–0.04)
IMM GRANULOCYTES NFR BLD AUTO: 0.2 % (ref 0–0.5)
LYMPHOCYTES # BLD AUTO: 1.7 K/UL (ref 1–4.8)
LYMPHOCYTES NFR BLD: 36.2 % (ref 18–48)
MCH RBC QN AUTO: 28.4 PG (ref 27–31)
MCHC RBC AUTO-ENTMCNC: 32 G/DL (ref 32–36)
MCV RBC AUTO: 89 FL (ref 82–98)
MONOCYTES # BLD AUTO: 0.4 K/UL (ref 0.3–1)
MONOCYTES NFR BLD: 9.4 % (ref 4–15)
NEUTROPHILS # BLD AUTO: 2.3 K/UL (ref 1.8–7.7)
NEUTROPHILS NFR BLD: 50.7 % (ref 38–73)
NRBC BLD-RTO: 0 /100 WBC
PATH REV BLD -IMP: NORMAL
PLATELET # BLD AUTO: 278 K/UL (ref 150–450)
PMV BLD AUTO: 8.9 FL (ref 9.2–12.9)
POTASSIUM SERPL-SCNC: 4.6 MMOL/L (ref 3.5–5.1)
PROT SERPL-MCNC: 8.6 G/DL (ref 6–8.4)
RBC # BLD AUTO: 5.38 M/UL (ref 4.6–6.2)
SODIUM SERPL-SCNC: 138 MMOL/L (ref 136–145)
VIT B12 SERPL-MCNC: 534 PG/ML (ref 210–950)
WBC # BLD AUTO: 4.59 K/UL (ref 3.9–12.7)

## 2021-04-16 PROCEDURE — 99204 OFFICE O/P NEW MOD 45 MIN: CPT | Mod: S$PBB,,, | Performed by: INTERNAL MEDICINE

## 2021-04-16 PROCEDURE — U0003 INFECTIOUS AGENT DETECTION BY NUCLEIC ACID (DNA OR RNA); SEVERE ACUTE RESPIRATORY SYNDROME CORONAVIRUS 2 (SARS-COV-2) (CORONAVIRUS DISEASE [COVID-19]), AMPLIFIED PROBE TECHNIQUE, MAKING USE OF HIGH THROUGHPUT TECHNOLOGIES AS DESCRIBED BY CMS-2020-01-R: HCPCS | Performed by: INTERNAL MEDICINE

## 2021-04-16 PROCEDURE — 85025 COMPLETE CBC W/AUTO DIFF WBC: CPT | Performed by: INTERNAL MEDICINE

## 2021-04-16 PROCEDURE — U0005 INFEC AGEN DETEC AMPLI PROBE: HCPCS | Performed by: INTERNAL MEDICINE

## 2021-04-16 PROCEDURE — 85060 PATHOLOGIST REVIEW: ICD-10-PCS | Mod: ,,, | Performed by: PATHOLOGY

## 2021-04-16 PROCEDURE — 86803 HEPATITIS C AB TEST: CPT | Performed by: INTERNAL MEDICINE

## 2021-04-16 PROCEDURE — 82746 ASSAY OF FOLIC ACID SERUM: CPT | Performed by: INTERNAL MEDICINE

## 2021-04-16 PROCEDURE — 99999 PR PBB SHADOW E&M-EST. PATIENT-LVL III: ICD-10-PCS | Mod: PBBFAC,,, | Performed by: INTERNAL MEDICINE

## 2021-04-16 PROCEDURE — 99999 PR PBB SHADOW E&M-EST. PATIENT-LVL III: CPT | Mod: PBBFAC,,, | Performed by: INTERNAL MEDICINE

## 2021-04-16 PROCEDURE — 82607 VITAMIN B-12: CPT | Performed by: INTERNAL MEDICINE

## 2021-04-16 PROCEDURE — 82525 ASSAY OF COPPER: CPT | Performed by: INTERNAL MEDICINE

## 2021-04-16 PROCEDURE — 99213 OFFICE O/P EST LOW 20 MIN: CPT | Mod: PBBFAC | Performed by: INTERNAL MEDICINE

## 2021-04-16 PROCEDURE — 86703 HIV-1/HIV-2 1 RESULT ANTBDY: CPT | Performed by: INTERNAL MEDICINE

## 2021-04-16 PROCEDURE — 86704 HEP B CORE ANTIBODY TOTAL: CPT | Performed by: INTERNAL MEDICINE

## 2021-04-16 PROCEDURE — 99204 PR OFFICE/OUTPT VISIT, NEW, LEVL IV, 45-59 MIN: ICD-10-PCS | Mod: S$PBB,,, | Performed by: INTERNAL MEDICINE

## 2021-04-16 PROCEDURE — 87340 HEPATITIS B SURFACE AG IA: CPT | Performed by: INTERNAL MEDICINE

## 2021-04-16 PROCEDURE — 86663 EPSTEIN-BARR ANTIBODY: CPT | Performed by: INTERNAL MEDICINE

## 2021-04-16 PROCEDURE — 85060 BLOOD SMEAR INTERPRETATION: CPT | Mod: ,,, | Performed by: PATHOLOGY

## 2021-04-16 PROCEDURE — 80053 COMPREHEN METABOLIC PANEL: CPT | Performed by: INTERNAL MEDICINE

## 2021-04-16 RX ORDER — ERGOCALCIFEROL 1.25 MG/1
CAPSULE ORAL
COMMUNITY

## 2021-04-17 LAB — SARS-COV-2 RNA RESP QL NAA+PROBE: NOT DETECTED

## 2021-04-19 ENCOUNTER — HOSPITAL ENCOUNTER (OUTPATIENT)
Dept: PULMONOLOGY | Facility: CLINIC | Age: 46
Discharge: HOME | End: 2021-04-19
Payer: MEDICAID

## 2021-04-19 DIAGNOSIS — R91.1 SOLITARY LUNG NODULE: ICD-10-CM

## 2021-04-19 LAB
COPPER SERPL-MCNC: 1340 UG/L (ref 665–1480)
EBV EA IGG SER-ACNC: <5 U/ML
EBV NA IGG SER-ACNC: 392 U/ML
EBV VCA IGG SER-ACNC: >750 U/ML
EBV VCA IGM SER-ACNC: <10 U/ML
PATH REV BLD -IMP: NORMAL

## 2021-04-19 PROCEDURE — 94729 DIFFUSING CAPACITY: CPT | Mod: 26,S$PBB,, | Performed by: INTERNAL MEDICINE

## 2021-04-19 PROCEDURE — 94060 PR EVAL OF BRONCHOSPASM: ICD-10-PCS | Mod: 26,S$PBB,, | Performed by: INTERNAL MEDICINE

## 2021-04-19 PROCEDURE — 94060 EVALUATION OF WHEEZING: CPT | Mod: PBBFAC | Performed by: INTERNAL MEDICINE

## 2021-04-19 PROCEDURE — 94729 DIFFUSING CAPACITY: CPT | Mod: PBBFAC | Performed by: INTERNAL MEDICINE

## 2021-04-19 PROCEDURE — 94729 PR C02/MEMBANE DIFFUSE CAPACITY: ICD-10-PCS | Mod: 26,S$PBB,, | Performed by: INTERNAL MEDICINE

## 2021-04-19 PROCEDURE — 94727 GAS DIL/WSHOT DETER LNG VOL: CPT | Mod: 26,S$PBB,, | Performed by: INTERNAL MEDICINE

## 2021-04-19 PROCEDURE — 94727 GAS DIL/WSHOT DETER LNG VOL: CPT | Mod: PBBFAC | Performed by: INTERNAL MEDICINE

## 2021-04-19 PROCEDURE — 94727 PR PULM FUNCTION TEST BY GAS: ICD-10-PCS | Mod: 26,S$PBB,, | Performed by: INTERNAL MEDICINE

## 2021-04-19 PROCEDURE — 94060 EVALUATION OF WHEEZING: CPT | Mod: 26,S$PBB,, | Performed by: INTERNAL MEDICINE

## 2021-04-20 ENCOUNTER — TELEPHONE (OUTPATIENT)
Dept: HEMATOLOGY/ONCOLOGY | Facility: CLINIC | Age: 46
End: 2021-04-20

## 2021-04-21 ENCOUNTER — TELEPHONE (OUTPATIENT)
Dept: UROLOGY | Facility: CLINIC | Age: 46
End: 2021-04-21

## 2022-06-17 ENCOUNTER — HOSPITAL ENCOUNTER (EMERGENCY)
Facility: OTHER | Age: 47
Discharge: HOME | End: 2022-06-17
Attending: EMERGENCY MEDICINE
Payer: MEDICAID

## 2022-06-17 VITALS
HEIGHT: 72 IN | DIASTOLIC BLOOD PRESSURE: 80 MMHG | SYSTOLIC BLOOD PRESSURE: 132 MMHG | BODY MASS INDEX: 21.4 KG/M2 | OXYGEN SATURATION: 100 % | RESPIRATION RATE: 16 BRPM | WEIGHT: 158 LBS | HEART RATE: 73 BPM | TEMPERATURE: 99 F

## 2022-06-17 DIAGNOSIS — R07.9 CHEST PAIN: ICD-10-CM

## 2022-06-17 DIAGNOSIS — R06.02 SOB (SHORTNESS OF BREATH): Primary | ICD-10-CM

## 2022-06-17 LAB
ALBUMIN SERPL BCP-MCNC: 4 G/DL (ref 3.5–5.2)
ALP SERPL-CCNC: 55 U/L (ref 55–135)
ALT SERPL W/O P-5'-P-CCNC: 22 U/L (ref 10–44)
ANION GAP SERPL CALC-SCNC: 11 MMOL/L (ref 8–16)
AST SERPL-CCNC: 30 U/L (ref 10–40)
BASOPHILS # BLD AUTO: 0.03 K/UL (ref 0–0.2)
BASOPHILS NFR BLD: 0.5 % (ref 0–1.9)
BILIRUB SERPL-MCNC: 1 MG/DL (ref 0.1–1)
BNP SERPL-MCNC: <10 PG/ML (ref 0–99)
BUN SERPL-MCNC: 10 MG/DL (ref 6–20)
CALCIUM SERPL-MCNC: 8.9 MG/DL (ref 8.7–10.5)
CHLORIDE SERPL-SCNC: 105 MMOL/L (ref 95–110)
CO2 SERPL-SCNC: 23 MMOL/L (ref 23–29)
CREAT SERPL-MCNC: 1 MG/DL (ref 0.5–1.4)
CTP QC/QA: YES
DIFFERENTIAL METHOD: ABNORMAL
EOSINOPHIL # BLD AUTO: 0 K/UL (ref 0–0.5)
EOSINOPHIL NFR BLD: 0.3 % (ref 0–8)
ERYTHROCYTE [DISTWIDTH] IN BLOOD BY AUTOMATED COUNT: 13 % (ref 11.5–14.5)
EST. GFR  (AFRICAN AMERICAN): >60 ML/MIN/1.73 M^2
EST. GFR  (NON AFRICAN AMERICAN): >60 ML/MIN/1.73 M^2
GLUCOSE SERPL-MCNC: 82 MG/DL (ref 70–110)
HCT VFR BLD AUTO: 48.7 % (ref 40–54)
HGB BLD-MCNC: 15.7 G/DL (ref 14–18)
IMM GRANULOCYTES # BLD AUTO: 0.02 K/UL (ref 0–0.04)
IMM GRANULOCYTES NFR BLD AUTO: 0.3 % (ref 0–0.5)
LYMPHOCYTES # BLD AUTO: 0.8 K/UL (ref 1–4.8)
LYMPHOCYTES NFR BLD: 12.7 % (ref 18–48)
MCH RBC QN AUTO: 28.7 PG (ref 27–31)
MCHC RBC AUTO-ENTMCNC: 32.2 G/DL (ref 32–36)
MCV RBC AUTO: 89 FL (ref 82–98)
MONOCYTES # BLD AUTO: 0.3 K/UL (ref 0.3–1)
MONOCYTES NFR BLD: 4.5 % (ref 4–15)
NEUTROPHILS # BLD AUTO: 5.4 K/UL (ref 1.8–7.7)
NEUTROPHILS NFR BLD: 81.7 % (ref 38–73)
NRBC BLD-RTO: 0 /100 WBC
PLATELET # BLD AUTO: 297 K/UL (ref 150–450)
PMV BLD AUTO: 8.8 FL (ref 9.2–12.9)
POTASSIUM SERPL-SCNC: 4.1 MMOL/L (ref 3.5–5.1)
PROT SERPL-MCNC: 7.9 G/DL (ref 6–8.4)
RBC # BLD AUTO: 5.47 M/UL (ref 4.6–6.2)
SARS-COV-2 RDRP RESP QL NAA+PROBE: NEGATIVE
SODIUM SERPL-SCNC: 139 MMOL/L (ref 136–145)
TROPONIN I SERPL DL<=0.01 NG/ML-MCNC: <0.006 NG/ML (ref 0–0.03)
TROPONIN I SERPL DL<=0.01 NG/ML-MCNC: <0.006 NG/ML (ref 0–0.03)
WBC # BLD AUTO: 6.64 K/UL (ref 3.9–12.7)

## 2022-06-17 PROCEDURE — U0002 COVID-19 LAB TEST NON-CDC: HCPCS | Performed by: EMERGENCY MEDICINE

## 2022-06-17 PROCEDURE — 84484 ASSAY OF TROPONIN QUANT: CPT | Mod: 91 | Performed by: NURSE PRACTITIONER

## 2022-06-17 PROCEDURE — 25000003 PHARM REV CODE 250: Performed by: NURSE PRACTITIONER

## 2022-06-17 PROCEDURE — 93010 EKG 12-LEAD: ICD-10-PCS | Mod: ,,, | Performed by: INTERNAL MEDICINE

## 2022-06-17 PROCEDURE — 85025 COMPLETE CBC W/AUTO DIFF WBC: CPT | Performed by: NURSE PRACTITIONER

## 2022-06-17 PROCEDURE — 83880 ASSAY OF NATRIURETIC PEPTIDE: CPT | Performed by: NURSE PRACTITIONER

## 2022-06-17 PROCEDURE — 93005 ELECTROCARDIOGRAM TRACING: CPT

## 2022-06-17 PROCEDURE — 80053 COMPREHEN METABOLIC PANEL: CPT | Performed by: NURSE PRACTITIONER

## 2022-06-17 PROCEDURE — 99285 EMERGENCY DEPT VISIT HI MDM: CPT | Mod: 25

## 2022-06-17 PROCEDURE — 93010 ELECTROCARDIOGRAM REPORT: CPT | Mod: ,,, | Performed by: INTERNAL MEDICINE

## 2022-06-17 RX ORDER — ASPIRIN 325 MG
325 TABLET ORAL
Status: COMPLETED | OUTPATIENT
Start: 2022-06-17 | End: 2022-06-17

## 2022-06-17 RX ADMIN — ASPIRIN 325 MG ORAL TABLET 325 MG: 325 PILL ORAL at 07:06

## 2022-06-17 NOTE — FIRST PROVIDER EVALUATION
" Emergency Department TeleTriage Encounter Note      CHIEF COMPLAINT    Chief Complaint   Patient presents with    Shortness of Breath       Pt to ER with c/o sudden SOB while driving. Pt reports chest tightness. Pt denies hx of anxiety. No N/V, dizziness, blurred vision.        VITAL SIGNS   Initial Vitals [06/17/22 1631]   BP Pulse Resp Temp SpO2   124/87 71 18 98 °F (36.7 °C) 98 %      MAP       --            ALLERGIES    Review of patient's allergies indicates:  No Known Allergies    PROVIDER TRIAGE NOTE  Started 30min ago.  Reports "heavy breathing, shortness."  Mainly in left chest, not sure if it radiates.        ORDERS  Labs Reviewed - No data to display    ED Orders (720h ago, onward)    Start Ordered     Status Ordering Provider    06/17/22 1937 06/17/22 1636  Troponin I #2  Once Timed         Ordered CHAMPAGNE, AMELIA A.    06/17/22 1645 06/17/22 1636  aspirin tablet 325 mg  ED 1 Time         Ordered CHAMPAGNE, AMELIA A.    06/17/22 1637 06/17/22 1636  Vital signs  Every 15 min         Ordered CHAMPAGNE AMELIA A.    06/17/22 1637 06/17/22 1636  Cardiac Monitoring - Adult  Continuous        Comments: Notify Physician If:    Ordered CHAMPAGNE, AMELIA A.    06/17/22 1637 06/17/22 1636  Pulse Oximetry Continuous  Continuous         Ordered CHAMPAGNE, AMELIA A.    06/17/22 1637 06/17/22 1636  Diet NPO  Diet effective now         Ordered CHAMPAGNE, AMELIA A.    06/17/22 1637 06/17/22 1636  Saline lock IV  Once         Ordered CHAMPAGNE, AMELIA A.    06/17/22 1637 06/17/22 1636  EKG 12-lead  Once        Comments: Do not perform if previously done during this visit/ triage    Ordered CHAMPAGNE, AMELIA A.    06/17/22 1637 06/17/22 1636  CBC auto differential  STAT         Ordered CHAMPAGNE, AMELIA A.    06/17/22 1637 06/17/22 1636  Comprehensive metabolic panel  STAT         Ordered CHAMPAGNE, AMELIA A.    06/17/22 1637 06/17/22 1636  Troponin I #1  STAT         Ordered CHAMPAGNE, AMELIA A.    06/17/22 1637 06/17/22 1636  " B-Type natriuretic peptide (BNP)  STAT         Ordered AMELIA JAY            Virtual Visit Note: The provider triage portion of this emergency department evaluation and documentation was performed via [employer], a HIPAA-compliant telemedicine application, in concert with a tele-presenter in the room. A face to face patient evaluation with one of my colleagues will occur once the patient is placed in an emergency department room.      DISCLAIMER: This note was prepared with [employer] voice recognition transcription software. Garbled syntax, mangled pronouns, and other bizarre constructions may be attributed to that software system.

## 2022-06-18 NOTE — ED TRIAGE NOTES
"Pt presents to the ED c/o SOB. Pt reports driving his car earlier when he began to get "extremely" SOB and experience chest tightness that radiates to the left arm. Denies SOB or chest tightness at this time. Denies any other complaints at this time. AAOx4  "

## 2022-06-18 NOTE — ED PROVIDER NOTES
"Encounter Date: 6/17/2022    SCRIBE #1 NOTE: I, Teresa Vigil, am scribing for, and in the presence of,  Sarahi Jaramillo MD. I have scribed the following portions of the note - Other sections scribed: HPI, ROS, PE, EKG, CXR.       History     Chief Complaint   Patient presents with    Shortness of Breath       Pt to ER with c/o sudden SOB while driving. Pt reports chest tightness. Pt denies hx of anxiety. No N/V, dizziness, blurred vision.      Shantanu Payne is a 46 y.o. otherwise healthy male with no PMHx on file who presents to the ED for evaluation of sudden onset dyspnea PTA, progression improving. Pt states he began feeling shortness of breath while driving, "I could breathe but I wasn't getting oxygen," prompting him to pull over and come to the ED. Endorses associated symptoms of chest tightness with pain radiating into his left arm upon deep inspiration. Pt is regularly followed by his PCP. Denies history of DVT/PE. Denies palpitations, nausea, vomiting, other somatic complaints, and pain elsewhere. This is the extent of the patient's complaints at this time.    The history is provided by the patient.     Review of patient's allergies indicates:  No Known Allergies  Past Medical History:   Diagnosis Date    Back pain     Foot pain      History reviewed. No pertinent surgical history.  History reviewed. No pertinent family history.  Social History     Tobacco Use    Smoking status: Never Smoker   Substance Use Topics    Alcohol use: Yes     Comment: Socially    Drug use: No     Review of Systems   Constitutional: Negative for activity change, appetite change, chills, diaphoresis and fever.   HENT: Negative for congestion, sore throat and trouble swallowing.    Eyes: Negative for photophobia and visual disturbance.   Respiratory: Positive for chest tightness and shortness of breath. Negative for cough.    Cardiovascular: Negative for chest pain and palpitations.   Gastrointestinal: Negative for abdominal " pain, nausea and vomiting.   Endocrine: Negative for polydipsia, polyphagia and polyuria.   Genitourinary: Negative for difficulty urinating and flank pain.   Musculoskeletal: Positive for myalgias (L arm). Negative for back pain and neck pain.   Skin: Negative for pallor.   Neurological: Negative for weakness and headaches.   Psychiatric/Behavioral: Negative for confusion.       Physical Exam     Initial Vitals [06/17/22 1631]   BP Pulse Resp Temp SpO2   124/87 71 18 98 °F (36.7 °C) 98 %      MAP       --         Physical Exam    Constitutional: He appears well-developed. He is cooperative.   Speaking full sentences.   HENT:   Head: Atraumatic.   Eyes: Conjunctivae and lids are normal.   Neck:   Normal range of motion.  Cardiovascular: Normal rate.   Pulmonary/Chest:   No increased work of breathing.   Musculoskeletal:         General: Normal range of motion.      Cervical back: Normal range of motion.     Neurological: He is alert.   Skin: No rash noted.   Psychiatric: He has a normal mood and affect. His speech is normal and behavior is normal.       ED Course   Procedures  Labs Reviewed   CBC W/ AUTO DIFFERENTIAL - Abnormal; Notable for the following components:       Result Value    MPV 8.8 (*)     Lymph # 0.8 (*)     Gran % 81.7 (*)     Lymph % 12.7 (*)     All other components within normal limits   COMPREHENSIVE METABOLIC PANEL   TROPONIN I   TROPONIN I   B-TYPE NATRIURETIC PEPTIDE   SARS-COV-2 RDRP GENE     EKG Readings: (Independently Interpreted)   Normal sinus rhythm at rate of 66 bpm, Narrow QRS, No STEMI.         Imaging Results          X-Ray Chest 1 View (Final result)  Result time 06/17/22 18:48:05    Final result by Eric Abarca MD (06/17/22 18:48:05)                 Impression:      No acute cardiopulmonary process identified.      Electronically signed by: Eric Abarca MD  Date:    06/17/2022  Time:    18:48             Narrative:    EXAMINATION:  XR CHEST 1 VIEW    CLINICAL  HISTORY:  Chest pain, unspecified    TECHNIQUE:  Single frontal view of the chest was performed.    COMPARISON:  June 2016.    FINDINGS:  Cardiac silhouette is normal in size.  Lungs are symmetrically expanded.  No evidence of focal consolidative process, pneumothorax, or significant pleural effusion.  No acute osseous abnormality identified.                              X-Rays:   Independently Interpreted Readings:   Chest X-Ray: No cardiomegaly. No fluid overload. No focal infiltrate. No pneumothorax.         Medications   aspirin tablet 325 mg (325 mg Oral Given 6/17/22 1908)     Medical Decision Making:   History:   Old Medical Records: I decided to obtain old medical records.  Clinical Tests:   Lab Tests: Ordered and Reviewed  Radiological Study: Ordered and Reviewed  Medical Tests: Ordered and Reviewed          Scribe Attestation:   Scribe #1: I performed the above scribed service and the documentation accurately describes the services I performed. I attest to the accuracy of the note.        ED Course as of 06/17/22 2104 Fri Jun 17, 2022   1950   Urgent evaluation a 46-year-old gentleman here following acute episode of shortness of breath that is now resolved.  Patient was driving when he felt chest tightness and increased work of breathing.  Currently his symptoms have completely resolved.  Denies specific stressors, no chest pain at this time.  Patient has no history of DVT or PE, VSS, Perc0, and labs reassuring, chest x-ray without cardiomegaly, no pneumothorax.  I do not suspect serious acute cardiopulmonary etiology at this time, patient reassured, encouraged to follow with PCP. [DM]      ED Course User Index  [DM] Sarahi Jaramillo MD           [employer], personally performed the services described in this documentation. All medical record entries made by the scribe were at my direction and in my presence. I have reviewed the chart and agree that the record reflects my personal performance and is  accurate and complete.  Clinical Impression:   Final diagnoses:  [R07.9] Chest pain  [R06.02] SOB (shortness of breath) (Primary)          ED Disposition Condition    Discharge Stable        ED Prescriptions     None        Follow-up Information     Follow up With Specialties Details Why Contact Info    YOUR PCP  Schedule an appointment as soon as possible for a visit today             Sarahi Jaramillo MD  06/17/22 4833

## 2025-02-10 ENCOUNTER — HOSPITAL ENCOUNTER (EMERGENCY)
Facility: OTHER | Age: 50
Discharge: HOME | End: 2025-02-10
Attending: EMERGENCY MEDICINE
Payer: MEDICAID

## 2025-02-10 VITALS
HEIGHT: 72 IN | BODY MASS INDEX: 21.67 KG/M2 | RESPIRATION RATE: 17 BRPM | SYSTOLIC BLOOD PRESSURE: 132 MMHG | WEIGHT: 160 LBS | DIASTOLIC BLOOD PRESSURE: 74 MMHG | OXYGEN SATURATION: 99 % | HEART RATE: 68 BPM | TEMPERATURE: 98 F

## 2025-02-10 DIAGNOSIS — S80.12XA CONTUSION OF LEFT LOWER LEG, INITIAL ENCOUNTER: Primary | ICD-10-CM

## 2025-02-10 DIAGNOSIS — S60.222A CONTUSION OF LEFT HAND, INITIAL ENCOUNTER: ICD-10-CM
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
## 2025-02-10 DIAGNOSIS — S43.402A SPRAIN OF LEFT SHOULDER, UNSPECIFIED SHOULDER SPRAIN TYPE, INITIAL ENCOUNTER: ICD-10-CM

## 2025-02-10 PROCEDURE — 25000003 PHARM REV CODE 250: Performed by: EMERGENCY MEDICINE

## 2025-02-10 PROCEDURE — 99284 EMERGENCY DEPT VISIT MOD MDM: CPT | Mod: 25

## 2025-02-10 PROCEDURE — 96372 THER/PROPH/DIAG INJ SC/IM: CPT | Performed by: EMERGENCY MEDICINE

## 2025-02-10 PROCEDURE — 63600175 PHARM REV CODE 636 W HCPCS: Mod: JZ,TB | Performed by: EMERGENCY MEDICINE

## 2025-02-10 RX ORDER — KETOROLAC TROMETHAMINE 30 MG/ML
15 INJECTION, SOLUTION INTRAMUSCULAR; INTRAVENOUS
Status: COMPLETED | OUTPATIENT
Start: 2025-02-10 | End: 2025-02-10

## 2025-02-10 RX ORDER — TRAMADOL HYDROCHLORIDE 50 MG/1
50 TABLET ORAL EVERY 6 HOURS PRN
Qty: 12 TABLET | Refills: 0 | Status: SHIPPED | OUTPATIENT
Start: 2025-02-10

## 2025-02-10 RX ORDER — TRAMADOL HYDROCHLORIDE 50 MG/1
50 TABLET ORAL
Status: COMPLETED | OUTPATIENT
Start: 2025-02-10 | End: 2025-02-10

## 2025-02-10 RX ORDER — IBUPROFEN 600 MG/1
600 TABLET ORAL EVERY 8 HOURS PRN
Qty: 30 TABLET | Refills: 0 | Status: SHIPPED | OUTPATIENT
Start: 2025-02-10

## 2025-02-10 RX ADMIN — TRAMADOL HYDROCHLORIDE 50 MG: 50 TABLET, COATED ORAL at 08:02

## 2025-02-10 RX ADMIN — KETOROLAC TROMETHAMINE 15 MG: 30 INJECTION, SOLUTION INTRAMUSCULAR; INTRAVENOUS at 08:02

## 2025-02-10 NOTE — ED TRIAGE NOTES
Pt reports pain to the left leg after stepping into a drain last night. He is having left hip, leg and shoulder pain after the fall.

## 2025-02-10 NOTE — FIRST PROVIDER EVALUATION
Medical screening examination initiated.  I have conducted a focused provider triage encounter, findings are as follows:    Brief history of present illness:  Fell in a man hole last night and his left leg has abrasions. Pt states he also has hand abrasion and shoulder pain    There were no vitals filed for this visit.    Pertinent physical exam:  abrasion to left lateral leg    Brief workup plan:  get in gown to evaluate better    Preliminary workup initiated; this workup will be continued and followed by the physician or advanced practice provider that is assigned to the patient when roomed.

## 2025-02-11 NOTE — ED PROVIDER NOTES
Encounter Date: 2/10/2025       History     Chief Complaint   Patient presents with    Fall     Pt had fall into uncovered city drain last night, complaining of pain to L leg, L shoulder, and L hip. Abrasion noted to L leg     49-year-old male with history of chronic hip pain presents for evaluation of injuries sustained s/p fall last night.  Patient was walking to his house and accidentally stepped into an open manhole with his left leg, falling forward onto grass bracing himself with his hands, no head trauma or LOC. he was able to get up and ambulate afterwards but felt immediate pain to his left lower leg that hit the side of the manhole opening.  He also states that the fall exacerbated his chronic left hip pain and also feels pain to his left wrist and shoulder from bracing his fall.  No headache or other injuries, no right-sided leg or arm pain.  Patient takes Mobic and tramadol chronically for his hip pain, is awaiting orthopedic evaluation for it, did not take any medications after this fall, was able to ambulate today      Review of patient's allergies indicates:  No Known Allergies  Past Medical History:   Diagnosis Date    Back pain     Foot pain      History reviewed. No pertinent surgical history.  No family history on file.  Social History     Tobacco Use    Smoking status: Never   Substance Use Topics    Alcohol use: Yes     Comment: Socially    Drug use: No     Review of Systems   Constitutional:  Negative for fever.   HENT:  Negative for congestion.    Eyes:  Negative for redness.   Respiratory:  Negative for shortness of breath.    Cardiovascular:  Negative for chest pain.   Gastrointestinal:  Negative for abdominal pain.   Genitourinary:  Negative for dysuria.   Musculoskeletal:  Positive for arthralgias.   Skin:  Negative for rash.   Neurological:  Negative for headaches.   Psychiatric/Behavioral:  Negative for confusion.        Physical Exam     Initial Vitals [02/10/25 1540]   BP Pulse Resp  Temp SpO2   137/77 79 20 99.1 °F (37.3 °C) 99 %      MAP       --         Physical Exam    Nursing note and vitals reviewed.  Constitutional: He appears well-developed and well-nourished. He is not diaphoretic. No distress.   HENT:   Head: Normocephalic and atraumatic.   Eyes: Conjunctivae are normal. No scleral icterus.   Neck: Neck supple.   Cardiovascular:  Normal rate, regular rhythm, normal heart sounds and intact distal pulses.           No murmur heard.  Pulmonary/Chest: Breath sounds normal. No respiratory distress. He has no wheezes. He has no rhonchi. He has no rales.   Abdominal: Abdomen is soft. There is no abdominal tenderness. There is no rebound and no guarding.   Musculoskeletal:      Cervical back: Neck supple.      Comments: Diffuse tenderness to anterior left lower tibia proximal to ankle with superficial abrasions and mild soft tissue edema.  Mild tenderness to left hip, ROM intact.  Mild tenderness to left palmar base of hand with small abrasion.  Mild left shoulder tenderness, ROM intact     Neurological: He is alert and oriented to person, place, and time.   Skin: Skin is warm and dry.   Psychiatric: He has a normal mood and affect.         ED Course   Procedures  Labs Reviewed - No data to display       Imaging Results              X-Ray Femur Ap/Lat Left (Final result)  Result time 02/10/25 20:04:49      Final result by Eric Abarca MD (02/10/25 20:04:49)                   Impression:      No acute displaced fracture seen.      Electronically signed by: Eric Abarca MD  Date:    02/10/2025  Time:    20:04               Narrative:    EXAMINATION:  XR HIP WITH PELVIS WHEN PERFORMED 2 OR 3 VIEWS LEFT; XR FEMUR 2 VIEW LEFT; XR TIBIA FIBULA 2 VIEW LEFT    CLINICAL HISTORY:  fall; Unspecified fall, initial encounter    TECHNIQUE:  AP view of the pelvis and frog leg lateral view of the left hip were performed.  Left femur AP and lateral. Left tibia fibula AP and  lateral.    COMPARISON:  None.    FINDINGS:  No evidence of acute displaced fracture, dislocation, or osseous destructive process.  Hip joint spaces appear fairly well maintained.  Left knee and ankle joint spaces are also preserved.                                       X-Ray Hand 3 view Left (Final result)  Result time 02/10/25 20:05:33      Final result by Eric Abarca MD (02/10/25 20:05:33)                   Impression:      No acute osseous abnormality identified.      Electronically signed by: Eric Abarca MD  Date:    02/10/2025  Time:    20:05               Narrative:    EXAMINATION:  XR HAND COMPLETE 3 VIEW LEFT    CLINICAL HISTORY:  fall;.    TECHNIQUE:  PA, lateral, and oblique views of the left hand were performed.    COMPARISON:  None    FINDINGS:  No evidence of acute displaced fracture, dislocation, or osseous destructive process.  No radiopaque retained foreign body seen.                                       X-Ray Shoulder Trauma Left (Final result)  Result time 02/10/25 19:59:24      Final result by Eric Abarca MD (02/10/25 19:59:24)                   Impression:      No acute osseous abnormality identified.      Electronically signed by: Eric Abarca MD  Date:    02/10/2025  Time:    19:59               Narrative:    EXAMINATION:  XR SHOULDER TRAUMA 3 VIEW LEFT    CLINICAL HISTORY:  Unspecified fall, initial encounter    TECHNIQUE:  Three views of the left shoulder were performed.    COMPARISON  None    FINDINGS:  No evidence of acute displaced fracture, dislocation, or osseous destructive process.                                       X-Ray Hip 2 or 3 views Left with Pelvis when performed (Final result)  Result time 02/10/25 20:04:49      Final result by Eric Abarca MD (02/10/25 20:04:49)                   Impression:      No acute displaced fracture seen.      Electronically signed by: Eric Abarca MD  Date:    02/10/2025  Time:    20:04               Narrative:     EXAMINATION:  XR HIP WITH PELVIS WHEN PERFORMED 2 OR 3 VIEWS LEFT; XR FEMUR 2 VIEW LEFT; XR TIBIA FIBULA 2 VIEW LEFT    CLINICAL HISTORY:  fall; Unspecified fall, initial encounter    TECHNIQUE:  AP view of the pelvis and frog leg lateral view of the left hip were performed.  Left femur AP and lateral. Left tibia fibula AP and lateral.    COMPARISON:  None.    FINDINGS:  No evidence of acute displaced fracture, dislocation, or osseous destructive process.  Hip joint spaces appear fairly well maintained.  Left knee and ankle joint spaces are also preserved.                                       X-Ray Thoracic Spine AP Lateral (Final result)  Result time 02/10/25 20:02:22      Final result by Eric Abarca MD (02/10/25 20:02:22)                   Impression:      No acute thoracic spine abnormalities identified.      Electronically signed by: Eric Abarca MD  Date:    02/10/2025  Time:    20:02               Narrative:    EXAMINATION:  XR THORACIC SPINE AP LATERAL    CLINICAL HISTORY:  fall;    TECHNIQUE:  AP and lateral views of the thoracic spine were performed.    COMPARISON:  None    FINDINGS:  Thoracic spine alignment appears within normal limits.  No evidence of acute thoracic spine fracture or subluxation.  Heart is normal in size.  Visualized lungs are clear.                                       X-Ray Tibia Fibula 2 View Left (Final result)  Result time 02/10/25 20:04:49      Final result by Eric Abarca MD (02/10/25 20:04:49)                   Impression:      No acute displaced fracture seen.      Electronically signed by: Eric Abarca MD  Date:    02/10/2025  Time:    20:04               Narrative:    EXAMINATION:  XR HIP WITH PELVIS WHEN PERFORMED 2 OR 3 VIEWS LEFT; XR FEMUR 2 VIEW LEFT; XR TIBIA FIBULA 2 VIEW LEFT    CLINICAL HISTORY:  fall; Unspecified fall, initial encounter    TECHNIQUE:  AP view of the pelvis and frog leg lateral view of the left hip were performed.  Left femur AP and  lateral. Left tibia fibula AP and lateral.    COMPARISON:  None.    FINDINGS:  No evidence of acute displaced fracture, dislocation, or osseous destructive process.  Hip joint spaces appear fairly well maintained.  Left knee and ankle joint spaces are also preserved.                                       Medications   ketorolac injection 15 mg (15 mg Intramuscular Given 2/10/25 2003)   traMADoL tablet 50 mg (50 mg Oral Given 2/10/25 2053)     Medical Decision Making      49-year-old male with history of chronic hip pain presents for evaluation of injuries sustained s/p fall last night.  Patient was at normal baseline, accidentally stepped with his left leg into a manhole that was left open, causing his left lower leg to hit the side of it and him to fall with no head trauma but braced himself with his arms causing left wrist and shoulder pain as well.  He has been ambulatory since then with some discomfort, takes tramadol and Mobic chronically for hip pain but has not taken this yet since injury.  On exam patient with normal vitals; he does have mild soft tissue edema and diffuse tenderness with overlying abrasions to left anterior lower leg proximal to ankle, and some left hip tenderness.  He also has mild left palmar hand base tenderness, but no snuffbox tenderness, and left shoulder tenderness but ROM intact.  Differential diagnosis includes tibia fracture, fibula fracture, lower leg contusion, exacerbation of chronic hip pain, shoulder sprain, wrist contusion.      X-ray of tib/fib, femur, hip, shoulder, hand with no sign of fracture/dislocation per my independent interpretation.  Patient also started complaining of upper back pain while getting x-rays so thoracic spine x-ray also done with no acute process.  Patient felt some improvement after IM Toradol but persistent pain, so was given a dose of his home tramadol as well.  No indication for further emergent imaging.  He is advised to continue NSAIDs and  tramadol as needed for further supportive care of left lower leg contusions and shoulder strain, and follow up with his orthopedist for further management, return to the ED for any worsening pain.      Amount and/or Complexity of Data Reviewed  Radiology: ordered.    Risk  Prescription drug management.                                      Clinical Impression:  Final diagnoses:  [W19.XXXA] Fall  [S80.12XA] Contusion of left lower leg, initial encounter (Primary)  [S60.222A] Contusion of left hand, initial encounter  [S43.402A] Sprain of left shoulder, unspecified shoulder sprain type, initial encounter          ED Disposition Condition    Discharge Stable          ED Prescriptions       Medication Sig Dispense Start Date End Date Auth. Provider    ibuprofen (ADVIL,MOTRIN) 600 MG tablet Take 1 tablet (600 mg total) by mouth every 8 (eight) hours as needed for Pain. 30 tablet 2/10/2025 -- Rudy Ocampo MD    traMADoL (ULTRAM) 50 mg tablet Take 1 tablet (50 mg total) by mouth every 6 (six) hours as needed for Pain. 12 tablet 2/10/2025 -- Rudy Ocampo MD          Follow-up Information       Follow up With Specialties Details Why Contact Info    [facility] - Emergency Dept Emergency Medicine Go to  [facility] symptoms worsen [address] [facility] [id number] [phone]             Rudy Ocampo MD  02/10/25 4317